# Patient Record
Sex: FEMALE | Race: WHITE
[De-identification: names, ages, dates, MRNs, and addresses within clinical notes are randomized per-mention and may not be internally consistent; named-entity substitution may affect disease eponyms.]

---

## 2022-11-06 ENCOUNTER — HOSPITAL ENCOUNTER (EMERGENCY)
Dept: HOSPITAL 76 - ED | Age: 52
Discharge: HOME | End: 2022-11-06
Payer: MEDICAID

## 2022-11-06 VITALS — SYSTOLIC BLOOD PRESSURE: 126 MMHG | DIASTOLIC BLOOD PRESSURE: 75 MMHG

## 2022-11-06 DIAGNOSIS — J01.00: ICD-10-CM

## 2022-11-06 DIAGNOSIS — R13.10: ICD-10-CM

## 2022-11-06 DIAGNOSIS — K21.00: Primary | ICD-10-CM

## 2022-11-06 LAB
ANION GAP SERPL CALCULATED.4IONS-SCNC: 7 MMOL/L (ref 6–13)
BASOPHILS NFR BLD AUTO: 0 10^3/UL (ref 0–0.1)
BASOPHILS NFR BLD AUTO: 0.4 %
BUN SERPL-MCNC: 17 MG/DL (ref 6–20)
CALCIUM UR-MCNC: 8.9 MG/DL (ref 8.5–10.3)
CHLORIDE SERPL-SCNC: 102 MMOL/L (ref 101–111)
CO2 SERPL-SCNC: 28 MMOL/L (ref 21–32)
CREAT SERPLBLD-SCNC: 0.8 MG/DL (ref 0.4–1)
EOSINOPHIL # BLD AUTO: 0.1 10^3/UL (ref 0–0.7)
EOSINOPHIL NFR BLD AUTO: 0.7 %
ERYTHROCYTE [DISTWIDTH] IN BLOOD BY AUTOMATED COUNT: 14.3 % (ref 12–15)
GFRSERPLBLD MDRD-ARVRAT: 75 ML/MIN/{1.73_M2} (ref 89–?)
GLUCOSE SERPL-MCNC: 93 MG/DL (ref 70–100)
HCT VFR BLD AUTO: 47.7 % (ref 37–47)
HGB UR QL STRIP: 14.8 G/DL (ref 12–16)
LYMPHOCYTES # SPEC AUTO: 2.6 10^3/UL (ref 1.5–3.5)
LYMPHOCYTES NFR BLD AUTO: 23.4 %
MCH RBC QN AUTO: 26.6 PG (ref 27–31)
MCHC RBC AUTO-ENTMCNC: 31 G/DL (ref 32–36)
MCV RBC AUTO: 85.6 FL (ref 81–99)
MONOCYTES # BLD AUTO: 0.6 10^3/UL (ref 0–1)
MONOCYTES NFR BLD AUTO: 5.5 %
NEUTROPHILS # BLD AUTO: 7.7 10^3/UL (ref 1.5–6.6)
NEUTROPHILS # SNV AUTO: 11 X10^3/UL (ref 4.8–10.8)
NEUTROPHILS NFR BLD AUTO: 69.7 %
NRBC # BLD AUTO: 0 /100WBC
NRBC # BLD AUTO: 0 X10^3/UL
PDW BLD AUTO: 9.8 FL (ref 7.9–10.8)
PLATELET # BLD: 322 10^3/UL (ref 130–450)
POTASSIUM SERPL-SCNC: 3.6 MMOL/L (ref 3.5–5)
RBC MAR: 5.57 10^6/UL (ref 4.2–5.4)
SODIUM SERPLBLD-SCNC: 137 MMOL/L (ref 135–145)

## 2022-11-06 PROCEDURE — 85025 COMPLETE CBC W/AUTO DIFF WBC: CPT

## 2022-11-06 PROCEDURE — 36415 COLL VENOUS BLD VENIPUNCTURE: CPT

## 2022-11-06 PROCEDURE — 99284 EMERGENCY DEPT VISIT MOD MDM: CPT

## 2022-11-06 PROCEDURE — 71046 X-RAY EXAM CHEST 2 VIEWS: CPT

## 2022-11-06 PROCEDURE — 99282 EMERGENCY DEPT VISIT SF MDM: CPT

## 2022-11-06 PROCEDURE — 80048 BASIC METABOLIC PNL TOTAL CA: CPT

## 2022-11-06 PROCEDURE — 70491 CT SOFT TISSUE NECK W/DYE: CPT

## 2022-11-06 NOTE — ED PHYSICIAN DOCUMENTATION
History of Present Illness





- Stated complaint


Stated Complaint: HARD TO SWALLOW





- Chief complaint


Chief Complaint: Heent





- History obtained from


History obtained from: Patient





- Additonal information


Additional information: 





Otherwise healthy 51-year-old woman has developed difficulty swallowing over the

last few days.  Its associated with feeling like things are getting stuck in her

throat.  Its associate with a mild cough but no body aches or fevers.  Not a 

sore throat per se.  This is never happened before.  No new medications.





Review of Systems


Constitutional: reports: Reviewed and negative


Eyes: reports: Reviewed and negative


Ears: reports: Reviewed and negative


Nose: reports: Reviewed and negative


Cardiac: reports: Reviewed and negative





PD PAST MEDICAL HISTORY





- Present Medications


Home Medications: 


                                Ambulatory Orders











 Medication  Instructions  Recorded  Confirmed


 


Amox/Clav 875/125 [Augmentin] 1 each PO Q12H #20 tablet 11/06/22 


 


Omeprazole 40 mg PO DAILY #30 cap 11/06/22 














- Allergies


Allergies/Adverse Reactions: 


                                    Allergies











Allergy/AdvReac Type Severity Reaction Status Date / Time


 


No Known Drug Allergies Allergy   Verified 11/06/22 16:04














PD ED PE NORMAL





- Vitals


Vital signs reviewed: Yes





- General


General: Alert and oriented X 3, No acute distress





- HEENT


HEENT: Other (Visualized portions of the oropharynx are normal, that said on 

anterior neck exam there seems to be a potential palpable mass bilaterally just 

above the hyoid.  It is not tender.)





- Neck


Neck: Supple, no meningeal sign





- Cardiac


Cardiac: RRR, No murmur





- Respiratory


Respiratory: No respiratory distress, Clear bilaterally





- Abdomen


Abdomen: Non tender





- Derm


Derm: No rash





- Neuro


Neuro: Alert and oriented X 3, Normal speech





Results





- Vitals


Vitals: 


                               Vital Signs - 24 hr











  11/06/22





  16:04


 


Temperature 36.6 C


 


Heart Rate 76


 


Respiratory 18





Rate 


 


O2 Saturation 98








                                     Oxygen











O2 Source                      Room air

















- Labs


Labs: 


                                Laboratory Tests











  11/06/22 11/06/22





  17:30 17:30


 


WBC  11.0 H 


 


RBC  5.57 H 


 


Hgb  14.8 


 


Hct  47.7 H 


 


MCV  85.6 


 


MCH  26.6 L 


 


MCHC  31.0 L 


 


RDW  14.3 


 


Plt Count  322 


 


MPV  9.8 


 


Neut # (Auto)  7.7 H 


 


Lymph # (Auto)  2.6 


 


Mono # (Auto)  0.6 


 


Eos # (Auto)  0.1 


 


Baso # (Auto)  0.0 


 


Absolute Nucleated RBC  0.00 


 


Nucleated RBC %  0.0 


 


Sodium   137


 


Potassium   3.6


 


Chloride   102


 


Carbon Dioxide   28


 


Anion Gap   7.0


 


BUN   17


 


Creatinine   0.8


 


Estimated GFR (MDRD)   75 L


 


Glucose   93


 


Calcium   8.9














- Rads (name of study)


  ** 2 view chest x-ray is unremarkable


Radiology: EMP read contemporaneously





PD MEDICAL DECISION MAKING





- ED course


ED course: 





51-year-old woman with history of difficulty swallowing, but exam is normal with

 the exception of a concern for a palpable mass above the hyoid.  She also has 

prominent complains of reflux and heartburn for which she only takes Tums.  CT 

of the neck with contrast to evaluate for mass lesion was negative for same 

except for maxillary sinus disease which is symptomatic and will be treated with

 antibiotics.  Also PPI for reflux.  She is to follow-up with ENT if not 

improved.





Departure





- Departure


Disposition: 01 Home, Self Care


Clinical Impression: 


Dysphagia


Qualifiers:


 Dysphagia type: unspecified Qualified Code(s): R13.10 - Dysphagia, unspecified





Sinusitis


Qualifiers:


 Sinusitis location: maxillary Chronicity: acute Recurrence: non-recurrent 

Qualified Code(s): J01.00 - Acute maxillary sinusitis, unspecified





Reflux esophagitis


Qualifiers:


 Esophagitis bleeding: without hemorrhage Qualified Code(s): K21.00 - Gastro-

esophageal reflux disease with esophagitis, without bleeding





Condition: Good


Record reviewed to determine appropriate education?: Yes


Instructions:  ED Sinusitis Abx Tx, ED GERD


Prescriptions: 


Amox/Clav 875/125 [Augmentin] 1 each PO Q12H #20 tablet


Omeprazole 40 mg PO DAILY #30 cap


Comments: 


I sent your prescriptions electronically to [facility] in [place].





You are seen today for feeling like something is stuck and difficulty 

swallowing.  Thankfully CT of the neck was negative for masses or any other 

abnormalities with the exception of fluid in your left maxillary Sinus, since 

you do also have prominent reflux I am treating that with omeprazole.  Return 

for new or worsening symptoms.  Follow-up with an ENT closer to home in a couple

 of weeks if not improved.

## 2022-11-06 NOTE — XRAY REPORT
PROCEDURE: Chest 2 View X-Ray

 

INDICATIONS: cough/diff swallowing

 

TECHNIQUE: 2 views of the chest were acquired.

 

COMPARISON: None.

 

FINDINGS: 

 

No focal infiltrates are seen. No pneumothorax or pleural effusions are seen.

 

Cardiac and mediastinal silhouettes are within normal limits.

 

No significant bony abnormality is seen for age. No overlying soft tissue abnormality is seen. No rad
iopaque foreign bodies are seen. 

 

No postoperative changes are seen.

 

 

 

IMPRESSION: 

 

Unremarkable chest plain films, without a cause of the patient's presenting symptoms identified.

 

Reviewed by: Matias Ellsworth MD on 11/6/2022 3:30 PM [facility]

Approved by: Matias Ellsworth MD on 11/6/2022 3:30 PM [facility]

 

 

Station ID:  [id number]

## 2023-07-04 ENCOUNTER — HOSPITAL ENCOUNTER (EMERGENCY)
Dept: HOSPITAL 76 - ED | Age: 53
Discharge: HOME | End: 2023-07-04
Payer: MEDICAID

## 2023-07-04 VITALS — SYSTOLIC BLOOD PRESSURE: 143 MMHG | DIASTOLIC BLOOD PRESSURE: 77 MMHG

## 2023-07-04 DIAGNOSIS — M62.830: Primary | ICD-10-CM

## 2023-07-04 PROCEDURE — 99283 EMERGENCY DEPT VISIT LOW MDM: CPT

## 2023-07-04 PROCEDURE — 99282 EMERGENCY DEPT VISIT SF MDM: CPT

## 2023-07-04 NOTE — ED PHYSICIAN DOCUMENTATION
PD HPI BACK PAIN





- Stated complaint


Stated Complaint: LB PX





- Chief complaint


Chief Complaint: Back Pain





- History obtained from


History obtained from: Patient





- Additional information


Additional information: 





She developed left low back pain yesterday while getting off the toilet.  Hurts 

to bend or twist.  There is no radiation to the pain.  No weakness, numbness, 

tingling, saddle anesthesia, incontinence, or fevers.  No history of back pain. 

She is been taking Tylenol and ibuprofen which is only modestly effective.





PD PAST MEDICAL HISTORY





- Present Medications


Home Medications: 


                                Ambulatory Orders











 Medication  Instructions  Recorded  Confirmed


 


Amox/Clav 875/125 [Augmentin] 1 each PO Q12H #20 tablet 11/06/22 


 


Omeprazole 40 mg PO DAILY #30 cap 11/06/22 


 


Cyclobenzaprine [Flexeril] 10 mg PO TID PRN #20 tablet 07/04/23 














- Allergies


Allergies/Adverse Reactions: 


                                    Allergies











Allergy/AdvReac Type Severity Reaction Status Date / Time


 


No Known Drug Allergies Allergy   Verified 11/06/22 16:04














PD ED PE NORMAL





- Vitals


Vital signs reviewed: Yes





- General


General: Alert and oriented X 3, No acute distress





- Back


Back: Other (Relatively painless movements with bending and twisting, but winces

 a bit with position changes.  No spinal tenderness.  The patient has equal and 

normal Achilles and patellar reflexes bilaterally.  Normal sensation in all 

areas of the legs.  Patient denies saddle anesthesia.  Normal strength in fle)





- Neuro


Neuro: Alert and oriented X 3, Normal speech





Results





- Vitals


Vitals: 





                               Vital Signs - 24 hr











  07/04/23





  10:47


 


Temperature 36.9 C


 


Heart Rate 85


 


Respiratory 18





Rate 


 


Blood Pressure 143/77 H


 


O2 Saturation 95








                                     Oxygen











O2 Source                      Room air

















PD Medical Decision Making





- ED course


ED course: 





This patient has seemingly uncomplicated musculoskeletal back pain.  The patient

 has no "red flags."  Specifically denies IV drug use, fevers, incontinence, 

saddle anesthesia.  Spinal epidural abscess was considered, given that the 

patient has no fever, is not diabetic, has no spinal tenderness, does not use IV

 drugs, and has no bilateral neurologic symptoms, the diagnosis of spinal 

epidural abscess is considered exceedingly unlikely.





Departure





- Departure


Disposition: 01 Home, Self Care


Clinical Impression: 


 Back spasm





Condition: Good


Record reviewed to determine appropriate education?: Yes


Instructions:  ED Spasm Back No Trauma


Prescriptions: 


Cyclobenzaprine [Flexeril] 10 mg PO TID PRN #20 tablet


 PRN Reason: Spasms


Comments: 


Continue the Tylenol and ibuprofen as you have been doing.  To this we are 

adding a muscle relaxer.  That is mildly sedating so do not drink or drive with 

it.  Follow-up with your doctor in about 3 days for recheck, return for new or 

worsening symptoms.





I could not you prescribe your prescription as the [facility] in [place] is not in 

our database.
